# Patient Record
Sex: MALE | Race: WHITE | URBAN - METROPOLITAN AREA
[De-identification: names, ages, dates, MRNs, and addresses within clinical notes are randomized per-mention and may not be internally consistent; named-entity substitution may affect disease eponyms.]

---

## 2017-10-27 ENCOUNTER — OFFICE VISIT (OUTPATIENT)
Dept: FAMILY MEDICINE | Facility: CLINIC | Age: 22
End: 2017-10-27
Payer: COMMERCIAL

## 2017-10-27 VITALS
BODY MASS INDEX: 27.86 KG/M2 | TEMPERATURE: 98.1 F | DIASTOLIC BLOOD PRESSURE: 71 MMHG | HEART RATE: 71 BPM | OXYGEN SATURATION: 99 % | HEIGHT: 71 IN | SYSTOLIC BLOOD PRESSURE: 122 MMHG | WEIGHT: 199 LBS

## 2017-10-27 DIAGNOSIS — F90.9 ATTENTION DEFICIT HYPERACTIVITY DISORDER (ADHD), UNSPECIFIED ADHD TYPE: Primary | ICD-10-CM

## 2017-10-27 PROCEDURE — 99213 OFFICE O/P EST LOW 20 MIN: CPT | Performed by: FAMILY MEDICINE

## 2017-10-27 RX ORDER — METHYLPHENIDATE HYDROCHLORIDE 36 MG/1
72 TABLET ORAL EVERY MORNING
Qty: 60 TABLET | Refills: 0 | Status: SHIPPED | OUTPATIENT
Start: 2017-10-27 | End: 2018-03-05

## 2017-10-27 RX ORDER — METHYLPHENIDATE HYDROCHLORIDE 36 MG/1
72 TABLET ORAL EVERY MORNING
Qty: 60 TABLET | Refills: 0 | Status: SHIPPED | OUTPATIENT
Start: 2017-12-27 | End: 2017-11-27

## 2017-10-27 RX ORDER — METHYLPHENIDATE HYDROCHLORIDE 36 MG/1
72 TABLET ORAL
COMMUNITY
Start: 2017-07-26 | End: 2017-10-27

## 2017-10-27 RX ORDER — METHYLPHENIDATE HYDROCHLORIDE 36 MG/1
72 TABLET ORAL EVERY MORNING
Qty: 60 TABLET | Refills: 0 | Status: SHIPPED | OUTPATIENT
Start: 2017-11-27 | End: 2017-11-27

## 2017-10-27 NOTE — MR AVS SNAPSHOT
"              After Visit Summary   10/27/2017    Aidan Cotter    MRN: [id number]           Patient Information     Date Of Birth          1995        Visit Information        Provider Department      10/27/2017 2:10 PM Marcelo Milligan MD [facility]        Today's Diagnoses     Attention deficit hyperactivity disorder (ADHD), unspecified ADHD type    -  1      Care Instructions    See you in 3 months.          Follow-ups after your visit        Who to contact     If you have questions or need follow up information about today's clinic visit or your schedule please contact [facility] directly at [phone].  Normal or non-critical lab and imaging results will be communicated to you by MyChart, letter or phone within 4 business days after the clinic has received the results. If you do not hear from us within 7 days, please contact the clinic through MyChart or phone. If you have a critical or abnormal lab result, we will notify you by phone as soon as possible.  Submit refill requests through [employer] or call your pharmacy and they will forward the refill request to us. Please allow 3 business days for your refill to be completed.          Additional Information About Your Visit        Care EveryWhere ID     This is your Care EveryWhere ID. This could be used by other organizations to access your [facility] medical records  [id number]        Your Vitals Were     Pulse Temperature Height Pulse Oximetry BMI (Body Mass Index)       71 98.1  F (36.7  C) (Oral) 5' 10.5\" (1.791 m) 99% 28.15 kg/m2        Blood Pressure from Last 3 Encounters:   10/27/17 122/71   09/26/16 136/76    Weight from Last 3 Encounters:   10/27/17 199 lb (90.3 kg)   09/26/16 201 lb (91.2 kg) (93 %)*     * Growth percentiles are based on CDC 2-20 Years data.              Today, you had the following     No orders found for display         Today's Medication Changes          These changes are accurate as of: " 10/27/17  2:36 PM.  If you have any questions, ask your nurse or doctor.               These medicines have changed or have updated prescriptions.        Dose/Directions    * methylphenidate ER 36 MG CR tablet   Commonly known as:  CONCERTA   This may have changed:  You were already taking a medication with the same name, and this prescription was added. Make sure you understand how and when to take each.   Used for:  Attention deficit hyperactivity disorder (ADHD), unspecified ADHD type   Changed by:  Marcelo Milligan MD        Dose:  72 mg   Take 2 tablets (72 mg) by mouth every morning   Quantity:  60 tablet   Refills:  0       * methylphenidate ER 36 MG CR tablet   Commonly known as:  CONCERTA   This may have changed:  You were already taking a medication with the same name, and this prescription was added. Make sure you understand how and when to take each.   Used for:  Attention deficit hyperactivity disorder (ADHD), unspecified ADHD type   Changed by:  Marcelo Milligan MD        Dose:  72 mg   Start taking on:  11/27/2017   Take 2 tablets (72 mg) by mouth every morning   Quantity:  60 tablet   Refills:  0       * methylphenidate ER 36 MG CR tablet   Commonly known as:  CONCERTA   This may have changed:  when to take this   Used for:  Attention deficit hyperactivity disorder (ADHD), unspecified ADHD type   Changed by:  Marcelo Milligan MD        Dose:  72 mg   Start taking on:  12/27/2017   Take 2 tablets (72 mg) by mouth every morning   Quantity:  60 tablet   Refills:  0       * Notice:  This list has 3 medication(s) that are the same as other medications prescribed for you. Read the directions carefully, and ask your doctor or other care provider to review them with you.         Where to get your medicines      Some of these will need a paper prescription and others can be bought over the counter.  Ask your nurse if you have questions.     Bring a paper prescription for each of these medications      methylphenidate ER 36 MG CR tablet    methylphenidate ER 36 MG CR tablet    methylphenidate ER 36 MG CR tablet                Primary Care Provider Office Phone # Fax #    Marcelo Milligan [phone] [address] [facility] [id number]        Equal Access to Services     [facility] LEDA : Hadii aad ku hadasho Soomaali, waaxda luqadaha, qaybta kaalmada adeegyada, waxay kevinin haytaran catarino cassidyjellycarmen bhatti. So [facility] [phone].    ATENCIÓN: Si habla español, tiene a bills disposición servicios gratuitos de asistencia lingüística. [facility] [phone].    We comply with applicable federal civil rights laws and [place] laws. We do not discriminate on the basis of race, color, national origin, age, disability, sex, sexual orientation, or gender identity.            Thank you!     Thank you for choosing [facility]  for your care. Our goal is always to provide you with excellent care. Hearing back from our patients is one way we can continue to improve our services. Please take a few minutes to complete the written survey that you may receive in the mail after your visit with us. Thank you!             Your Updated Medication List - Protect others around you: Learn how to safely use, store and throw away your medicines at www.disposemymeds.org.          This list is accurate as of: 10/27/17  2:36 PM.  Always use your most recent med list.                   Brand Name Dispense Instructions for use Diagnosis    * methylphenidate ER 36 MG CR tablet    CONCERTA    60 tablet    Take 2 tablets (72 mg) by mouth every morning    Attention deficit hyperactivity disorder (ADHD), unspecified ADHD type       * methylphenidate ER 36 MG CR tablet   Start taking on:  11/27/2017    CONCERTA    60 tablet    Take 2 tablets (72 mg) by mouth every morning    Attention deficit hyperactivity disorder (ADHD), unspecified ADHD type       * methylphenidate ER 36 MG CR tablet   Start taking on:  12/27/2017     CONCERTA    60 tablet    Take 2 tablets (72 mg) by mouth every morning    Attention deficit hyperactivity disorder (ADHD), unspecified ADHD type       * Notice:  This list has 3 medication(s) that are the same as other medications prescribed for you. Read the directions carefully, and ask your doctor or other care provider to review them with you.

## 2017-10-27 NOTE — PROGRESS NOTES
"HPI:    I am seeing Aidan Cotter for the 1st time. he is a 22 year old male here to discuss:    ADHD - dx'd as a child. Was following with [facility]. Symptoms are low focus/concentration. Difficulty multi tasking. Easily distracted. Difficult to perform at school. Denies anxiety, depression, marni or hypomania.  Evaluation and treatment:    Has been following with [facility] - records reviewed via Care Everywhere.   Has been taking Concerta 72 mg qd - effective without side effects.   I refilled Concerta with warnings.    Declines flu shot.    ROS:    Const: No fevers, weight changes or night sweats recently.  ENT: No runny nose, sore throat or ear pain.  Resp: No cough or shortness of breath.  CV: No chest pain, dizziness or cardiac palpitations.  GI: No nausea, vomiting, diarrhea or constipation. Denies blood in stools or black stools.  : No dysuria, frequency or hematuria.  The rest of the ROS negative, other than listed on HPI    SH:    Marital status: girlfriend - monogamous  Kids: no  Employment: going to [employer]. Also just got a part time job.  Exercise:   Tobacco: no  Etoh:   Recreational drugs: no  Caffeine:     Exam:    /71 (Cuff Size: Adult Large)  Pulse 71  Temp 98.1  F (36.7  C) (Oral)  Ht 5' 10.5\" (1.791 m)  Wt 199 lb (90.3 kg)  SpO2 99%  BMI 28.15 kg/m2    Gen: Healthy appearing male in no acute distress  Neck: No enlarged lymph nodes, thyromegally or other masses.  Lungs: Good air movement and otherwise clear.  CV: Heart RRR with no murmurs. No JVD, carotid bruits or leg edema.  Psych: Affect is normal. Speech is fluent. Thought logical. Insight and judgement seem to be intact. Denies SI or HI.        Assessment and Plan - Decision Making    1. Attention deficit hyperactivity disorder (ADHD), unspecified ADHD type  Per HPI  - methylphenidate ER (CONCERTA) 36 MG CR tablet; Take 2 tablets (72 mg) by mouth every morning  Dispense: 60 tablet; Refill: 0  - " methylphenidate ER (CONCERTA) 36 MG CR tablet; Take 2 tablets (72 mg) by mouth every morning  Dispense: 60 tablet; Refill: 0  - methylphenidate ER (CONCERTA) 36 MG CR tablet; Take 2 tablets (72 mg) by mouth every morning  Dispense: 60 tablet; Refill: 0      Written instructions given as follows:    Patient Instructions   See you in 3 months.

## 2017-10-27 NOTE — NURSING NOTE
"Chief Complaint   Patient presents with     A.D.H.D       Initial /71 (Cuff Size: Adult Large)  Pulse 71  Temp 98.1  F (36.7  C) (Oral)  Ht 5' 10.5\" (1.791 m)  Wt 199 lb (90.3 kg)  SpO2 99%  BMI 28.15 kg/m2 Estimated body mass index is 28.15 kg/(m^2) as calculated from the following:    Height as of this encounter: 5' 10.5\" (1.791 m).    Weight as of this encounter: 199 lb (90.3 kg).  Medication Reconciliation: complete    Rohini Dunn LPN    "

## 2017-11-27 ENCOUNTER — TELEPHONE (OUTPATIENT)
Dept: FAMILY MEDICINE | Facility: CLINIC | Age: 22
End: 2017-11-27

## 2017-11-27 DIAGNOSIS — F90.9 ATTENTION DEFICIT HYPERACTIVITY DISORDER (ADHD), UNSPECIFIED ADHD TYPE: ICD-10-CM

## 2017-11-27 RX ORDER — METHYLPHENIDATE HYDROCHLORIDE 36 MG/1
72 TABLET ORAL EVERY MORNING
Qty: 60 TABLET | Refills: 0 | Status: SHIPPED | OUTPATIENT
Start: 2017-12-27 | End: 2017-11-29

## 2017-11-27 RX ORDER — METHYLPHENIDATE HYDROCHLORIDE 36 MG/1
72 TABLET ORAL EVERY MORNING
Qty: 60 TABLET | Refills: 0 | Status: SHIPPED | OUTPATIENT
Start: 2017-12-27 | End: 2018-03-05

## 2017-11-27 NOTE — TELEPHONE ENCOUNTER
Patient is calling reporting that he lost a prescription for his Concerta medication. Please call patient to discuss and advise. Thank you

## 2017-11-27 NOTE — TELEPHONE ENCOUNTER
Pt states the prescriptions were stapled together and he had them at his girlfriend's house and thought he brought them home but can't locate them anywhere.  He has broken up with the girlfriend.  I checked  and last filled 10-29-17.  Pt asking for replacement prescription's.  Shahla Cohn RN

## 2017-11-28 NOTE — TELEPHONE ENCOUNTER
Brought 2 Rx's to the  for . Left message on patient's voicemail that this was done.Kristin Vasquez MA/TC

## 2017-11-29 ENCOUNTER — TELEPHONE (OUTPATIENT)
Dept: FAMILY MEDICINE | Facility: CLINIC | Age: 22
End: 2017-11-29

## 2017-11-29 DIAGNOSIS — F90.9 ATTENTION DEFICIT HYPERACTIVITY DISORDER (ADHD), UNSPECIFIED ADHD TYPE: ICD-10-CM

## 2017-11-29 RX ORDER — METHYLPHENIDATE HYDROCHLORIDE 36 MG/1
72 TABLET ORAL EVERY MORNING
Qty: 60 TABLET | Refills: 0 | Status: SHIPPED | OUTPATIENT
Start: 2018-01-27 | End: 2018-03-05

## 2017-11-29 NOTE — TELEPHONE ENCOUNTER
Advised pharmacy fill one this month and one next month.  To provider to cosign.  Shahla Cohn RN

## 2017-11-29 NOTE — TELEPHONE ENCOUNTER
Pranav calling to get clarification on RX's patient trying to fill yesterday. Concerta 36mg. Both RX's have 11/27 date, and they need a verbal to fill one and hold the other for December fill. Please call to advise. Thank you

## 2017-11-30 ENCOUNTER — OFFICE VISIT (OUTPATIENT)
Dept: BEHAVIORAL HEALTH | Facility: CLINIC | Age: 22
End: 2017-11-30
Payer: COMMERCIAL

## 2017-11-30 DIAGNOSIS — F41.1 GAD (GENERALIZED ANXIETY DISORDER): ICD-10-CM

## 2017-11-30 DIAGNOSIS — F90.9 ATTENTION DEFICIT HYPERACTIVITY DISORDER (ADHD), UNSPECIFIED ADHD TYPE: ICD-10-CM

## 2017-11-30 DIAGNOSIS — F32.1 MAJOR DEPRESSIVE DISORDER, SINGLE EPISODE, MODERATE (H): Primary | ICD-10-CM

## 2017-11-30 PROCEDURE — 90791 PSYCH DIAGNOSTIC EVALUATION: CPT | Performed by: MARRIAGE & FAMILY THERAPIST

## 2017-11-30 ASSESSMENT — ANXIETY QUESTIONNAIRES
1. FEELING NERVOUS, ANXIOUS, OR ON EDGE: MORE THAN HALF THE DAYS
GAD7 TOTAL SCORE: 9
3. WORRYING TOO MUCH ABOUT DIFFERENT THINGS: MORE THAN HALF THE DAYS
7. FEELING AFRAID AS IF SOMETHING AWFUL MIGHT HAPPEN: SEVERAL DAYS
5. BEING SO RESTLESS THAT IT IS HARD TO SIT STILL: SEVERAL DAYS
6. BECOMING EASILY ANNOYED OR IRRITABLE: NOT AT ALL
2. NOT BEING ABLE TO STOP OR CONTROL WORRYING: MORE THAN HALF THE DAYS
IF YOU CHECKED OFF ANY PROBLEMS ON THIS QUESTIONNAIRE, HOW DIFFICULT HAVE THESE PROBLEMS MADE IT FOR YOU TO DO YOUR WORK, TAKE CARE OF THINGS AT HOME, OR GET ALONG WITH OTHER PEOPLE: SOMEWHAT DIFFICULT

## 2017-11-30 ASSESSMENT — PATIENT HEALTH QUESTIONNAIRE - PHQ9
SUM OF ALL RESPONSES TO PHQ QUESTIONS 1-9: 13
5. POOR APPETITE OR OVEREATING: SEVERAL DAYS
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
## 2017-12-01 ASSESSMENT — ANXIETY QUESTIONNAIRES: GAD7 TOTAL SCORE: 9

## 2017-12-04 ENCOUNTER — TELEPHONE (OUTPATIENT)
Dept: FAMILY MEDICINE | Facility: CLINIC | Age: 22
End: 2017-12-04

## 2017-12-04 PROBLEM — F41.1 GAD (GENERALIZED ANXIETY DISORDER): Status: ACTIVE | Noted: 2017-12-04

## 2017-12-04 PROBLEM — F32.1 MAJOR DEPRESSIVE DISORDER, SINGLE EPISODE, MODERATE (H): Status: ACTIVE | Noted: 2017-12-04

## 2017-12-04 NOTE — TELEPHONE ENCOUNTER
Hanna states that she would like to know the date she can fill the prescription for Concerta.  Please call.    Thank you.

## 2017-12-04 NOTE — TELEPHONE ENCOUNTER
See 11/29 telephone encounter.  Pharmacy states patient dropped off prescription dated 1/27/18. Unsure when to fill and if has one for 12/27.  Informed pharmacy of phone call 11/29 that both were written for same date but to fill one 11/27 and one 12/27.   They realized they gave prescription dated 12/27 back to patient.    Gay THOMASN, RN, CPN

## 2018-03-02 ENCOUNTER — TELEPHONE (OUTPATIENT)
Dept: FAMILY MEDICINE | Facility: CLINIC | Age: 23
End: 2018-03-02

## 2018-03-02 NOTE — TELEPHONE ENCOUNTER
Patient is calling to request a refill for methylphenidate ER (CONCERTA) 36 MG CR tablet patient is out of this medication  Please call when this is ready at   Thank you

## 2018-03-05 ENCOUNTER — OFFICE VISIT (OUTPATIENT)
Dept: FAMILY MEDICINE | Facility: CLINIC | Age: 23
End: 2018-03-05
Payer: COMMERCIAL

## 2018-03-05 VITALS
DIASTOLIC BLOOD PRESSURE: 70 MMHG | RESPIRATION RATE: 20 BRPM | OXYGEN SATURATION: 100 % | WEIGHT: 197 LBS | TEMPERATURE: 97.9 F | HEART RATE: 79 BPM | BODY MASS INDEX: 27.58 KG/M2 | HEIGHT: 71 IN | SYSTOLIC BLOOD PRESSURE: 138 MMHG

## 2018-03-05 DIAGNOSIS — F90.9 ATTENTION DEFICIT HYPERACTIVITY DISORDER (ADHD), UNSPECIFIED ADHD TYPE: ICD-10-CM

## 2018-03-05 PROCEDURE — 99213 OFFICE O/P EST LOW 20 MIN: CPT | Performed by: FAMILY MEDICINE

## 2018-03-05 RX ORDER — METHYLPHENIDATE HYDROCHLORIDE 36 MG/1
72 TABLET ORAL EVERY MORNING
Qty: 60 TABLET | Refills: 0 | Status: SHIPPED | OUTPATIENT
Start: 2018-03-05

## 2018-03-05 RX ORDER — METHYLPHENIDATE HYDROCHLORIDE 36 MG/1
72 TABLET ORAL EVERY MORNING
Qty: 60 TABLET | Refills: 0 | Status: SHIPPED | OUTPATIENT
Start: 2018-04-05

## 2018-03-05 RX ORDER — METHYLPHENIDATE HYDROCHLORIDE 36 MG/1
72 TABLET ORAL EVERY MORNING
Qty: 60 TABLET | Refills: 0 | Status: SHIPPED | OUTPATIENT
Start: 2018-05-05

## 2018-03-05 NOTE — PROGRESS NOTES
"HPI:    Aidan is a 22 year old male here to discuss:    ADHD - dx'd as a child. Was following with [facility]. Symptoms are low focus/concentration. Difficulty multi tasking. Easily distracted. Difficult to perform at school. Denies anxiety, depression, marni or hypomania.  Evaluation and treatment:    Had been following with [facility] - records previously reviewed via [facility].   Concerta 72 mg qd - effective without side effects.   I refilled Concerta with warnings.    Declines flu shot.    ROS:    Const: No fevers, weight changes or night sweats recently.  ENT: No runny nose, sore throat or ear pain.  Resp: No cough or shortness of breath.  CV: No chest pain, dizziness or cardiac palpitations.  GI: No nausea, vomiting, diarrhea or constipation. Denies blood in stools or black stools.  : No dysuria, frequency or hematuria.  The rest of the ROS negative, other than listed on HPI    SH:    Marital status: girlfriend - monogamous  Kids: no  Employment: going to [employer]. Also just got a part time job.  Exercise:   Tobacco: no  Etoh:   Recreational drugs: no  Caffeine:     Exam:    /70 (Cuff Size: Adult Regular)  Pulse 79  Temp 97.9  F (36.6  C) (Oral)  Resp 20  Ht 5' 11\" (1.803 m)  Wt 197 lb (89.4 kg)  SpO2 100%  BMI 27.48 kg/m2    Gen: Healthy appearing male in no acute distress  Psych: Affect is normal. Speech is fluent. Thought logical. Insight and judgement seem to be intact. Denies SI or HI.    Assessment and Plan - Decision Making    1. Attention deficit hyperactivity disorder (ADHD), unspecified ADHD type  Per HPI  - methylphenidate ER (CONCERTA) 36 MG CR tablet; Take 2 tablets (72 mg) by mouth every morning  Dispense: 60 tablet; Refill: 0  - methylphenidate ER (CONCERTA) 36 MG CR tablet; Take 2 tablets (72 mg) by mouth every morning  Dispense: 60 tablet; Refill: 0  - methylphenidate ER (CONCERTA) 36 MG CR tablet; Take 2 tablets (72 mg) by mouth every morning  " Dispense: 60 tablet; Refill: 0      Written instructions given as follows:    Patient Instructions   See you in 3 months.

## 2018-03-05 NOTE — MR AVS SNAPSHOT
"              After Visit Summary   3/5/2018    Aidan Cotter    MRN: [id number]           Patient Information     Date Of Birth          1995        Visit Information        Provider Department      3/5/2018 2:10 PM Marcelo Milligan MD [facility]        Today's Diagnoses     Attention deficit hyperactivity disorder (ADHD), unspecified ADHD type          Care Instructions    See you in 3 months.          Follow-ups after your visit        Who to contact     If you have questions or need follow up information about today's clinic visit or your schedule please contact [facility] directly at [phone].  Normal or non-critical lab and imaging results will be communicated to you by MyChart, letter or phone within 4 business days after the clinic has received the results. If you do not hear from us within 7 days, please contact the clinic through [employer] or phone. If you have a critical or abnormal lab result, we will notify you by phone as soon as possible.  Submit refill requests through [employer] or call your pharmacy and they will forward the refill request to us. Please allow 3 business days for your refill to be completed.          Additional Information About Your Visit        MyChart Information     [employer] gives you secure access to your electronic health record. If you see a primary care provider, you can also send messages to your care team and make appointments. If you have questions, please call your primary care clinic.  If you do not have a primary care provider, please call [phone] and they will assist you.        Care EveryWhere ID     This is your Care EveryWhere ID. This could be used by other organizations to access your [facility] medical records  [id number]        Your Vitals Were     Pulse Temperature Respirations Height Pulse Oximetry BMI (Body Mass Index)    79 97.9  F (36.6  C) (Oral) 20 5' 11\" (1.803 m) 100% 27.48 kg/m2       Blood Pressure from " Last 3 Encounters:   03/05/18 138/70   10/27/17 122/71   09/26/16 136/76    Weight from Last 3 Encounters:   03/05/18 197 lb (89.4 kg)   10/27/17 199 lb (90.3 kg)   09/26/16 201 lb (91.2 kg) (93 %)*     * Growth percentiles are based on [facility] 2-20 Years data.              Today, you had the following     No orders found for display         Where to get your medicines      Some of these will need a paper prescription and others can be bought over the counter.  Ask your nurse if you have questions.     Bring a paper prescription for each of these medications     methylphenidate ER 36 MG CR tablet    methylphenidate ER 36 MG CR tablet    methylphenidate ER 36 MG CR tablet          Primary Care Provider Office Phone # Fax #    Marcelo Milligan [phone] [address] [facility] [address]        Equal Access to Services     ZOHAIB TOMPKINS : Hadii isabel ramsay hadasho Sofabienneali, waaxda luqadaha, qaybta kaalmada adeegyada, hailey brownin pepito mac . So [facility] [phone].    ATENCIÓN: Si habla español, tiene a bills disposición servicios gratuitos de asistencia lingüística. Llame al [phone].    We comply with applicable federal civil rights laws and [place] laws. We do not discriminate on the basis of race, color, national origin, age, disability, sex, sexual orientation, or gender identity.            Thank you!     Thank you for choosing [facility]  for your care. Our goal is always to provide you with excellent care. Hearing back from our patients is one way we can continue to improve our services. Please take a few minutes to complete the written survey that you may receive in the mail after your visit with us. Thank you!             Your Updated Medication List - Protect others around you: Learn how to safely use, store and throw away your medicines at www.disposemymeds.org.          This list is accurate as of 3/5/18  2:41 PM.  Always use your most recent med list.                    Brand Name Dispense Instructions for use Diagnosis    * methylphenidate ER 36 MG CR tablet    CONCERTA    60 tablet    Take 2 tablets (72 mg) by mouth every morning    Attention deficit hyperactivity disorder (ADHD), unspecified ADHD type       * methylphenidate ER 36 MG CR tablet   Start taking on:  4/5/2018    CONCERTA    60 tablet    Take 2 tablets (72 mg) by mouth every morning    Attention deficit hyperactivity disorder (ADHD), unspecified ADHD type       * methylphenidate ER 36 MG CR tablet   Start taking on:  5/5/2018    CONCERTA    60 tablet    Take 2 tablets (72 mg) by mouth every morning    Attention deficit hyperactivity disorder (ADHD), unspecified ADHD type       * Notice:  This list has 3 medication(s) that are the same as other medications prescribed for you. Read the directions carefully, and ask your doctor or other care provider to review them with you.

## 2018-03-05 NOTE — NURSING NOTE
"Chief Complaint   Patient presents with     A.D.H.D     Depression       Initial /64 (Cuff Size: Adult Regular)  Pulse 79  Temp 97.9  F (36.6  C) (Oral)  Resp 20  Ht 5' 11\" (1.803 m)  Wt 197 lb (89.4 kg)  SpO2 100%  BMI 27.48 kg/m2 Estimated body mass index is 27.48 kg/(m^2) as calculated from the following:    Height as of this encounter: 5' 11\" (1.803 m).    Weight as of this encounter: 197 lb (89.4 kg).      Rohini Dunn LPN    "

## 2018-04-15 ENCOUNTER — HEALTH MAINTENANCE LETTER (OUTPATIENT)
Age: 23
End: 2018-04-15

## 2018-05-08 ENCOUNTER — HEALTH MAINTENANCE LETTER (OUTPATIENT)
Age: 23
End: 2018-05-08

## 2020-03-10 ENCOUNTER — HEALTH MAINTENANCE LETTER (OUTPATIENT)
Age: 25
End: 2020-03-10

## 2020-12-27 ENCOUNTER — HEALTH MAINTENANCE LETTER (OUTPATIENT)
Age: 25
End: 2020-12-27

## 2021-04-24 ENCOUNTER — HEALTH MAINTENANCE LETTER (OUTPATIENT)
Age: 26
End: 2021-04-24

## 2021-05-04 NOTE — MR AVS SNAPSHOT
After Visit Summary   11/30/2017    Aidan Cotter    MRN: [id number]           Patient Information     Date Of Birth          1995        Visit Information        Provider Department      11/30/2017 2:00 PM Anusha Mclaughlin [facility]        Today's Diagnoses     Major depressive disorder, single episode, moderate (H)    -  1    ANGÉLICA (generalized anxiety disorder)        Attention deficit hyperactivity disorder (ADHD), unspecified ADHD type           Follow-ups after your visit        Your next 10 appointments already scheduled     Dec 14, 2017  1:30 PM CST   Return Visit with Anusha Mclaughlin   [facility]    [address] [facility] [address] [phone]              Who to contact     If you have questions or need follow up information about today's clinic visit or your schedule please contact [facility] directly at [phone].  Normal or non-critical lab and imaging results will be communicated to you by MyChart, letter or phone within 4 business days after the clinic has received the results. If you do not hear from us within 7 days, please contact the clinic through MyChart or phone. If you have a critical or abnormal lab result, we will notify you by phone as soon as possible.  Submit refill requests through [employer] or call your pharmacy and they will forward the refill request to us. Please allow 3 business days for your refill to be completed.          Additional Information About Your Visit        Care EveryWhere ID     This is your Care EveryWhere ID. This could be used by other organizations to access your [place] medical records  [id number]         Blood Pressure from Last 3 Encounters:   10/27/17 122/71   09/26/16 136/76    Weight from Last 3 Encounters:   10/27/17 90.3 kg (199 lb)   09/26/16 91.2 kg (201 lb) (93 %)*     * Growth percentiles are based on CDC 2-20 Years data.     Ipledge Number (Optional): [id number]           Today, you had the following     No orders found for display       Primary Care Provider Office Phone # Fax #    Marcelo Milligan [phone] [address] [facility] [address]        Equal Access to Services     AKILAH TOMPKINS : Hadii isabel ku hadlaurieo Soomaali, waaxda luqadaha, qaybta kaalmada adeegyada, hailey kevinin hayaan lincolnaruna hernandez estefania bhatti. So [facility] [phone].    ATENCIÓN: Si habla español, tiene a bills disposición servicios gratuitos de asistencia lingüística. Llame al [phone].    We comply with applicable federal civil rights laws and [place] laws. We do not discriminate on the basis of race, color, national origin, age, disability, sex, sexual orientation, or gender identity.            Thank you!     Thank you for choosing [facility]  for your care. Our goal is always to provide you with excellent care. Hearing back from our patients is one way we can continue to improve our services. Please take a few minutes to complete the written survey that you may receive in the mail after your visit with us. Thank you!             Your Updated Medication List - Protect others around you: Learn how to safely use, store and throw away your medicines at www.disposemymeds.org.          This list is accurate as of: 11/30/17 11:59 PM.  Always use your most recent med list.                   Brand Name Dispense Instructions for use Diagnosis    * methylphenidate ER 36 MG CR tablet    CONCERTA    60 tablet    Take 2 tablets (72 mg) by mouth every morning    Attention deficit hyperactivity disorder (ADHD), unspecified ADHD type       * methylphenidate ER 36 MG CR tablet   Start taking on:  12/27/2017    CONCERTA    60 tablet    Take 2 tablets (72 mg) by mouth every morning    Attention deficit hyperactivity disorder (ADHD), unspecified ADHD type       * methylphenidate ER 36 MG CR tablet   Start taking on:  1/27/2018    CONCERTA    60 tablet    Take 2 tablets (72 mg) by  Ipledge Number (Optional): [id number] mouth every morning    Attention deficit hyperactivity disorder (ADHD), unspecified ADHD type       * Notice:  This list has 3 medication(s) that are the same as other medications prescribed for you. Read the directions carefully, and ask your doctor or other care provider to review them with you.

## 2021-10-09 ENCOUNTER — HEALTH MAINTENANCE LETTER (OUTPATIENT)
Age: 26
End: 2021-10-09

## 2022-05-16 ENCOUNTER — HEALTH MAINTENANCE LETTER (OUTPATIENT)
Age: 27
End: 2022-05-16

## 2022-09-11 ENCOUNTER — HEALTH MAINTENANCE LETTER (OUTPATIENT)
Age: 27
End: 2022-09-11